# Patient Record
Sex: FEMALE | URBAN - METROPOLITAN AREA
[De-identification: names, ages, dates, MRNs, and addresses within clinical notes are randomized per-mention and may not be internally consistent; named-entity substitution may affect disease eponyms.]

---

## 2017-12-26 ENCOUNTER — EMERGENCY (EMERGENCY)
Facility: HOSPITAL | Age: 47
LOS: 1 days | Discharge: HOME | End: 2017-12-26
Admitting: EMERGENCY MEDICINE
Payer: COMMERCIAL

## 2017-12-26 VITALS
DIASTOLIC BLOOD PRESSURE: 76 MMHG | RESPIRATION RATE: 16 BRPM | OXYGEN SATURATION: 99 % | HEART RATE: 88 BPM | TEMPERATURE: 99 F | SYSTOLIC BLOOD PRESSURE: 118 MMHG | WEIGHT: 149.91 LBS

## 2017-12-26 DIAGNOSIS — R30.0 DYSURIA: ICD-10-CM

## 2017-12-26 DIAGNOSIS — R31.9 HEMATURIA, UNSPECIFIED: ICD-10-CM

## 2017-12-26 DIAGNOSIS — Z79.2 LONG TERM (CURRENT) USE OF ANTIBIOTICS: ICD-10-CM

## 2017-12-26 LAB
APPEARANCE UR: CLEAR — SIGNIFICANT CHANGE UP
BILIRUB UR-MCNC: NEGATIVE — SIGNIFICANT CHANGE UP
COLOR SPEC: (no result)
DIFF PNL FLD: (no result)
GLUCOSE UR QL: 100
KETONES UR-MCNC: NEGATIVE — SIGNIFICANT CHANGE UP
LEUKOCYTE ESTERASE UR-ACNC: (no result)
NITRITE UR-MCNC: NEGATIVE — SIGNIFICANT CHANGE UP
PH UR: 5.5 — SIGNIFICANT CHANGE UP (ref 5–8)
PROT UR-MCNC: 30 MG/DL
SP GR SPEC: 1.01 — SIGNIFICANT CHANGE UP (ref 1–1.03)
UROBILINOGEN FLD QL: 0.2 E.U./DL — SIGNIFICANT CHANGE UP

## 2017-12-26 PROCEDURE — 99284 EMERGENCY DEPT VISIT MOD MDM: CPT

## 2017-12-26 PROCEDURE — 81001 URINALYSIS AUTO W/SCOPE: CPT

## 2017-12-26 PROCEDURE — 87086 URINE CULTURE/COLONY COUNT: CPT

## 2017-12-26 PROCEDURE — 99283 EMERGENCY DEPT VISIT LOW MDM: CPT

## 2017-12-26 RX ORDER — MOXIFLOXACIN HYDROCHLORIDE TABLETS, 400 MG 400 MG/1
1 TABLET, FILM COATED ORAL
Qty: 6 | Refills: 0 | OUTPATIENT
Start: 2017-12-26 | End: 2017-12-28

## 2017-12-26 NOTE — ED ADULT TRIAGE NOTE - OTHER COMPLAINTS
c/o of urinary frequency since last wednesdasy with blood in urine, pt says she was started on macrobid and now on keflex.  She had two urine tests that were neg

## 2017-12-26 NOTE — ED ADULT NURSE NOTE - OBJECTIVE STATEMENT
UTI and was on macro bid Wed. to Fri; then no better was switched to cephalexin on Fri until today and no better- has burning on urination and blood in urine ; denies all other symptoms

## 2017-12-26 NOTE — ED PROVIDER NOTE - MEDICAL DECISION MAKING DETAILS
46 y/o f dysuria x 5 days; urine today with moderate leuk esterase, >10wbc, will switch to cipro.  Possibility of chemical irritation from douche with neg urine culture, given f/u with urology

## 2017-12-26 NOTE — ED PROVIDER NOTE - OBJECTIVE STATEMENT
46 y/o f presents c/o dysuria x 5 days.  Pt stating she initially went to urgent care, was started on macrobid, then was switched to keflex 4 days ago because sx were not improving.  Pt stating now having some blood in the urine.  Pt stating her culture didn't grow bacteria but sx persist.  Pt reporting sx started after douching.  Denies vaginal discharge, recent unprotected intercourse, fever, abd pain, back pain, vomiting, all other ROS negative.

## 2017-12-27 LAB
CULTURE RESULTS: SIGNIFICANT CHANGE UP
SPECIMEN SOURCE: SIGNIFICANT CHANGE UP

## 2017-12-27 RX ORDER — CEPHALEXIN 500 MG
0 CAPSULE ORAL
Qty: 0 | Refills: 0 | COMMUNITY

## 2020-08-08 ENCOUNTER — RESULT REVIEW (OUTPATIENT)
Age: 50
End: 2020-08-08